# Patient Record
Sex: MALE | Race: ASIAN | URBAN - METROPOLITAN AREA
[De-identification: names, ages, dates, MRNs, and addresses within clinical notes are randomized per-mention and may not be internally consistent; named-entity substitution may affect disease eponyms.]

---

## 2017-09-21 ENCOUNTER — VIRTUAL VISIT (OUTPATIENT)
Dept: FAMILY MEDICINE | Facility: OTHER | Age: 47
End: 2017-09-21

## 2017-09-21 NOTE — PROGRESS NOTES
"Date:   Clinician: Yadira Molina  Clinician NPI: [id number]  Patient: Marissa Parnell  Patient : 1970  Patient Address: [facility] [address], [place] [address]  Patient Phone: [phone]  Visit Protocol: Conjunctivitis  Patient Summary:  Marissa is a 47 year old (: 1970 ) who initiated a Visit for evaluation of conjunctivitis.  When asked the question \"Please sign me up to receive news, health information and promotions from [employer].\", Marissa responded \"No\".    Marissa uploaded images of his eye condition.   His symptoms started suddenly, have persisted for less than 24 hours and affect both eyes equally. Marissa describes his symptoms in the following way: the white part of the eye is mostly red. There is thick white drainage coming from his eye(s). His eye(s) is stuck shut in the morning from the drainage.   He does not have a headache. Marissa denies feeling feverish.   Marissa denies:     Recent injury to the eye    Getting dust, dirt, or some other material in the eye(s)    A recent decrease in vision    Significant sensitivity to light    Receiving eye surgery in the past month    Being treated for an eye infection in the past 2 to 4 weeks    A new rash on the face    Having a bump on the eyelid    Glaucoma    Receiving a diagnosis of conjunctivitis within the past month    Having high blood pressure    Wearing contact lenses    Subconjunctival hemorrhage    Itchy eyes    Eye pain    The eye(s) are swollen or puffy    Having seasonal allergies     Marissa has not been recently exposed to anyone with an eye infection he has not recently had a respiratory infection.   Marissa has not been vaccinated for chickenpox and has not been vaccinated for shingles. The patient had chickenpox in the past.   Marissa states that he does not need proof of treatment for his eye condition before returning to school, work, or .   Marissa is not currently taking any medications to treat his symptoms.   Marissa does " not smoke or use smokeless tobacco.   MEDICATIONS:  Phenylephrine (Sudafed PE) and loratadine (Claritin, Tavist ND)   , ALLERGIES:  NKDA   Clinician Response:  Dear Marissa,  Based on the information you provided, you most likely have bacterial conjunctivitis, more commonly called Pink Eye.   I am prescribing the following medication(s):   Polymyxin B-Trimethoprim Solution (Polytrim). Apply 1 drop in the affected eye(s) every 3 hours, up to 6 times a day for 7 days.   I am prescribing an antibiotic medication. Many infections can be caused by either bacteria or a virus and symptoms are often very similar, so it is possible that this is a viral infection. Antibiotics are only effective against bacterial infections, so when it is caused by a virus, the medication will not help symptoms improve or make it less contagious.   To prevent the spread of your infection, do not touch your hands to your eyes - even to itch them. Wash your hands regularly - at least once per hour. Change your towel and washcloth daily and don't share them with others.   It is very important to use the eye drops exactly as directed. Do not use the eye drops longer than prescribed as this will increase the chance of side-effects. If you are taking your medications as directed and you do not see any improvements after 2 days, you need to be seen in a clinic for further evaluation.   Diagnosis: Bacterial Conjunctivitis  Diagnosis ICD: H10.9  Prescription: polymyxin B/trimethoprim (Polytrim) 10,000 units-1mg 1ml ophthalmic solution 10 ml, 7 days supply. One drop in the affected eye(s) every 3 hours up to 6 times a day for 7 days. Refills: 0, Refill as needed: no, Allow substitutions: yes  Pharmacy: [facility] [id number] - [phone] - [address] [phone]